# Patient Record
Sex: MALE | URBAN - METROPOLITAN AREA
[De-identification: names, ages, dates, MRNs, and addresses within clinical notes are randomized per-mention and may not be internally consistent; named-entity substitution may affect disease eponyms.]

---
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
## 2021-05-25 ENCOUNTER — RECORDS - HEALTHEAST (OUTPATIENT)
Dept: ADMINISTRATIVE | Facility: CLINIC | Age: 21
End: 2021-05-25

## 2021-05-27 ENCOUNTER — RECORDS - HEALTHEAST (OUTPATIENT)
Dept: ADMINISTRATIVE | Facility: CLINIC | Age: 21
End: 2021-05-27

## 2022-10-23 NOTE — PATIENT INSTRUCTIONS
Thank you for choosing [facility].  You may be receiving a survey in the mail from Akil Harmon regarding your visit today.  Please take a few minutes to complete and return the survey to let us know how we are doing.      If you have questions or concerns, please contact us via [employer] or you can contact your care team at [phone].    Our Clinic hours are:  Monday 6:40 am  to 7:00 pm  Tuesday -Friday 6:40 am to 5:00 pm    The [place] outpatient lab hours are:  Monday - Friday 6:10 am to 4:45 pm  Saturdays 7:00 am to 11:00 am  Appointments are required, call [phone]    If you have clinical questions after hours or would like to schedule an appointment,  call the clinic at [phone].     * HEAD INJURY [Child: no wake-up]    Your child has had a mild head injury. It does not appear serious at this time. Sometimes symptoms of a more serious problem (bruising or bleeding in the brain) may appear later. Therefore, during the next 24 hours watch for the WARNING SIGNS listed below.  HOME CARE:  1. During the next 24 hours someone must stay with your child to check for the signs below. It is okay to let your child sleep when tired. It is not necessary to keep him awake or wake him up during the night.  2. If there is swelling of the face or scalp, apply an ice pack (ice cubes in a plastic bag, wrapped in a towel) for 20 minutes every 1-2 hours until the swelling starts to go down.  3. Do not use aspirin after a head injury. You may use acetaminophen (Tylenol) or ibuprofen (Motrin, Advil) to control pain, unless another pain medicine was prescribed. [NOTE: If your child has chronic liver or kidney disease or ever had a stomach ulcer or GI bleeding, talk with your doctor before using these medicines.] Do not use ibuprofen in children under six months of age.  4. For the next 24 hours    Do not give medicines that might make your child sleepy.    No strenuous activities. No lifting or straining.  5. If  Pt starting to get sleepy from zyprexa. Pulling against his restraints.   your child has had any symptoms of a concussion today (nausea, vomiting, dizziness, confusion, headache, memory loss or was knocked out), do not return to sports or any activity that could result in another head injury until all symptoms are gone and your child has been cleared by your doctor. A second head injury before fully recovering from the first one can lead to serious brain injury.  FOLLOW UP with your doctor if symptoms are not improving after 24 hours, or as directed.  [NOTE: A radiologist will review any X-rays or CT scans that were taken. We will notify you of any new findings that may affect your child's care.]  GET PROMPT MEDICAL ATTENTION if any of the following occur:    Repeated vomiting    Severe or worsening headache or dizziness    Unusual drowsiness, or unable to awaken as usual    Confusion or change in behavior or speech, memory loss, blurred vision    Convulsion (seizure)    Increasing scalp or face swelling    Redness, warmth or pus from the swollen area    Fluid drainage or bleeding from the nose or ears    [id number] [address], [place] [address]. All rights reserved. This information is not intended as a substitute for professional medical care. Always follow your healthcare professional's instructions.
